# Patient Record
(demographics unavailable — no encounter records)

---

## 2018-10-01 NOTE — RAD
MR#: [id number]

Account#: [id number]

Accession#: [id number]

Date of Study: 10/01/2018

Ordering Physician: LILIAN ROCHA, 

Referring Physician: LILIAN ROCHA 

Tech: Hillray Keen RDMS RVT





--------------- APPROVED REPORT --------------





Patient Location : OUT-PATIENT



Indications

Varicose Veins

Gray scale images of the SFJ reveals no significant thrombus on limited imaging. The bilateral Greate
r and lesser saphenous veins do not show any evidence of reflux. The left and right greater saphenous
 veins are compressible and normal in size. 



Critical Notification

Critical Value: No



<Conclusion>

No reflux in the bilateral greater and lesser saphenous veins. 



Signed by : Wally Suazo, 

Electronically Approved : 10/01/2018 12:25:46